# Patient Record
Sex: MALE | Race: WHITE | Employment: OTHER | RURAL
[De-identification: names, ages, dates, MRNs, and addresses within clinical notes are randomized per-mention and may not be internally consistent; named-entity substitution may affect disease eponyms.]

---

## 2017-06-21 ENCOUNTER — HOSPITAL ENCOUNTER (OUTPATIENT)
Dept: OTHER | Age: 67
Discharge: HOME | End: 2017-06-21
Attending: NURSE PRACTITIONER | Admitting: NURSE PRACTITIONER

## 2017-06-21 DIAGNOSIS — M25.531 ACUTE PAIN OF BOTH WRISTS: ICD-10-CM

## 2017-06-21 DIAGNOSIS — S05.11XA CONTUSION OF RIGHT EYE, INITIAL ENCOUNTER: ICD-10-CM

## 2017-06-21 DIAGNOSIS — M25.532 ACUTE PAIN OF BOTH WRISTS: ICD-10-CM

## 2017-06-21 DIAGNOSIS — R07.81 ANTERIOR PLEURITIC PAIN: ICD-10-CM

## 2018-07-11 ENCOUNTER — TELEPHONE (OUTPATIENT)
Dept: SURGERY | Age: 68
End: 2018-07-11

## 2018-07-11 NOTE — TELEPHONE ENCOUNTER
Received referral papers from [facility] for pt to see Dr. Blake Friends for a colon screening. I called pt to schedule the appointment, but received no answer. I left pt a voicemail and will try back at a later date.

## 2018-07-20 ENCOUNTER — OFFICE VISIT (OUTPATIENT)
Dept: SURGERY | Age: 68
End: 2018-07-20
Payer: MEDICARE

## 2018-07-20 ENCOUNTER — SURG/PROC ORDERS (OUTPATIENT)
Dept: SURGERY | Age: 68
End: 2018-07-20

## 2018-07-20 VITALS
HEIGHT: 75 IN | SYSTOLIC BLOOD PRESSURE: 127 MMHG | BODY MASS INDEX: 27.91 KG/M2 | HEART RATE: 87 BPM | RESPIRATION RATE: 18 BRPM | WEIGHT: 224.5 LBS | TEMPERATURE: 98.3 F | DIASTOLIC BLOOD PRESSURE: 92 MMHG

## 2018-07-20 DIAGNOSIS — R63.4 WEIGHT LOSS: ICD-10-CM

## 2018-07-20 DIAGNOSIS — Z12.11 COLON CANCER SCREENING: Primary | ICD-10-CM

## 2018-07-20 DIAGNOSIS — Z86.010 HISTORY OF ADENOMATOUS POLYP OF COLON: ICD-10-CM

## 2018-07-20 DIAGNOSIS — K92.1 HEMATOCHEZIA: ICD-10-CM

## 2018-07-20 PROCEDURE — G8427 DOCREV CUR MEDS BY ELIG CLIN: HCPCS | Performed by: SURGERY

## 2018-07-20 PROCEDURE — 3017F COLORECTAL CA SCREEN DOC REV: CPT | Performed by: SURGERY

## 2018-07-20 PROCEDURE — 1101F PT FALLS ASSESS-DOCD LE1/YR: CPT | Performed by: SURGERY

## 2018-07-20 PROCEDURE — 99204 OFFICE O/P NEW MOD 45 MIN: CPT | Performed by: SURGERY

## 2018-07-20 PROCEDURE — 99204 OFFICE O/P NEW MOD 45 MIN: CPT

## 2018-07-20 PROCEDURE — G8419 CALC BMI OUT NRM PARAM NOF/U: HCPCS | Performed by: SURGERY

## 2018-07-20 RX ORDER — CHOLECALCIFEROL (VITAMIN D3) 1250 MCG
1 CAPSULE ORAL WEEKLY
COMMUNITY

## 2018-07-20 RX ORDER — SODIUM CHLORIDE 0.9 % (FLUSH) 0.9 %
10 SYRINGE (ML) INJECTION PRN
Status: CANCELLED | OUTPATIENT
Start: 2018-07-20 | End: 2019-07-20

## 2018-07-20 RX ORDER — SODIUM CHLORIDE, SODIUM LACTATE, POTASSIUM CHLORIDE, CALCIUM CHLORIDE 600; 310; 30; 20 MG/100ML; MG/100ML; MG/100ML; MG/100ML
INJECTION, SOLUTION INTRAVENOUS CONTINUOUS
Status: CANCELLED | OUTPATIENT
Start: 2018-07-20 | End: 2019-07-20

## 2018-07-20 RX ORDER — SODIUM CHLORIDE 0.9 % (FLUSH) 0.9 %
10 SYRINGE (ML) INJECTION EVERY 12 HOURS SCHEDULED
Status: CANCELLED | OUTPATIENT
Start: 2018-07-20 | End: 2019-07-20

## 2018-07-20 RX ORDER — POTASSIUM CHLORIDE 1.5 G/1.77G
20 POWDER, FOR SOLUTION ORAL 2 TIMES DAILY
COMMUNITY

## 2018-07-20 RX ORDER — LORATADINE 10 MG/1
10 TABLET ORAL DAILY
COMMUNITY

## 2018-07-20 ASSESSMENT — ENCOUNTER SYMPTOMS
EYES NEGATIVE: 1
RESPIRATORY NEGATIVE: 1
GASTROINTESTINAL NEGATIVE: 1

## 2018-07-20 NOTE — PROGRESS NOTES
Consult Note    Patient:   Lydia Jamil   YOB: 1950   Facility:   [place] [address]   Referring/PCP: Kaden Florentino  Date:     7/20/2018  Consultant:   Maria Guadalupe Vidales MD  Reason for consult: colon screening    Subjective: This 79 y.o. male was seen  for colon screening. Patient has Last colonoscopy was 3 1/2 years ago and had benign adenomatous polyp. Information was obtained from interview of  the patient, examination of the patient, and review of records. I did  update the past medical, surgical, social and / or family history. Patient has had no symptoms. Has lost 20 pounds in last year due to dieting. bright red, visible only on toilet paper, occuring only during bowel movements, and occured a couple of months ago. Medications:     Prior to Admission medications    Medication Sig Start Date End Date Taking? Authorizing Provider   potassium chloride (KLOR-CON) 20 MEQ packet Take 20 mEq by mouth 2 times daily 1/2 tablet   Yes Historical Provider, MD   Cholecalciferol (VITAMIN D3) 17716 units CAPS Take 1 capsule by mouth once a week   Yes Historical Provider, MD   loratadine (CLARITIN) 10 MG tablet Take 10 mg by mouth daily   Yes Historical Provider, MD   levothyroxine (SYNTHROID) 175 MCG tablet Take 125 mcg by mouth Daily    Yes Historical Provider, MD   amLODIPine (NORVASC) 10 MG tablet Take 10 mg by mouth daily. Yes Historical Provider, MD   losartan (COZAAR) 100 MG tablet Take 50 mg by mouth 2 times daily (with meals). Yes Historical Provider, MD   albuterol (PROVENTIL HFA;VENTOLIN HFA) 108 (90 BASE) MCG/ACT inhaler Inhale 2 puffs into the lungs every 6 hours as needed for Wheezing. Yes Historical Provider, MD   gabapentin (NEURONTIN) 300 MG capsule Take 300 mg by mouth 2 times daily. Historical Provider, MD   cetirizine (ZYRTEC) 10 MG tablet Take 10 mg by mouth daily. Historical Provider, MD        Allergies:    Allergies   Allergen Cardiovascular: Normal rate, regular rhythm and normal heart sounds. Pulmonary/Chest: Breath sounds normal.   Abdominal: Soft. Bowel sounds are normal. He exhibits no distension and no mass. There is no tenderness. There is no guarding. Musculoskeletal: Normal range of motion. He exhibits no edema or tenderness. Neurological: He is alert and oriented to person, place, and time. Skin: Skin is warm and dry. Assessment: For elective colon screening  H/o colon polyps  Weight loss  Hematochezia      Plan:   1. Patient has reviewed colonoscopy pamphlet and we have discussed the possible risks and complications, including the remote chance of perforation, and the patient appears to understand and gives informed consent  2. We are scheduling a colonoscopy at [facility] on 07/25/18. 3. The patient will take the outpatient Miralax prep. Copy of this consult has been faxed to BHARATH Amezcua; thank you for the opportunity to participate in this patient's care.     Electronically signed by Jeremy Herrera MD on 7/20/2018 at 11:11 AM

## 2018-07-20 NOTE — LETTER
REQUEST AND INFORMED CONSENT FOR SURGICAL AND/OR DIAGNOSTIC PROCEDURE    Name: Lavelle Herrera   : 1950  Age: 79 y.o. Sex: male   MRN: [id number]  Guarantor Acct: [de-identified]   Clinic: [facility]  Physician: Dr. Cherre Sever, Lavelle Herrera ,hereby authorize Burke Bynum M.D., and his assistants that may be selected by him to administer such treatment necessary and to perform the following test/procedure/surgical procedure:    Colonoscopy with possible biopsies and/or polypectomy    The procedure necessary to treat my condition, as well as the alternate methods of treatment, and the attendant risks and consequences that are associated with the above have been explained to me by the physician. I have been informed there may be other risks from known and/or unknown causes that the practices of medicine and surgery is not an exact science, and I acknowledge that no guarantees have been made to me concerning the results of this procedure. The physician and/or their assistants have satisfactorily answered all questions, which I have asked about the procedure, in terms that I fully understand. I consent to the administration of anesthetic agents or medication deemed appropriate and desirable by the physician. I authorize and request the physician and his assistant(s) to perform any other tests/procedures in the event that unforeseen conditions are revealed as may be necessary and desirable in the exercise of his professional judgement. This authorization shall extend to treating all conditions that require treatment and are not known at the time this procedure commences.     By signing this form, I acknowledge that I have read it carefully or had it read or explained to me and that I understand this form and its content, and I hereby voluntarily consent to the above.      ________________________________  2018 10:33 AM   Patient or nearest relative      ________________________________ Relationship      ________________________________  Witness

## 2018-07-20 NOTE — LETTER
3:00 pm.   Mix one 238g bottle of MiraLAX with 64oz of Gatorade or Crystal Light in a pitcher. Drink a glass every 10  15 minutes until all 64 ounces is consumed. It is very important that you drink all of the MiraLAX/Gatorade or Crystal Light mixture. 5:00 p.m. Take 2 Dulcolax tablets with 8 to 12 ounces of clear liquids  5:00 p.m.  12:00 a.m. Continue with a clear liquid diet. You may not have anything to eat or drink after midnight. You may continue with your normal diet after the procedure is complete. Surgery Date: _______________  Instructions and Expectations One Day Prior to Procedure:  ? Someone from [facility] will be in contact with you to confirm your arrival time.  (if someone has not contacted you by 4:00 p.m. the day before your procedure, you will need to contact the office to confirm your arrival time)  ? NOTHING TO EAT OR DRINK AFTER MIDNIGHT (this also includes gum, mints, and tobacco products)    Special Instructions: Instructions for the Day of the Procedure:  ? Do not take any medications that morning, including blood pressure and diabetic medications. ? Bring all the medications with you to the hospital the morning of the procedure. ? Brush your teeth, rinse your mouth, but do not swallow the water. ? Leave all jewelry (including wedding rings) or anything valuable at home. ? All piercings should be removed as follows:  navel, tongue, ears, nipples, eyebrows, vagina, nose, or penis. ? The morning of surgery take a shower in plain soap and water. ? Do not wear any make-up, mascara, nail polish, or artificial nails. ? Hair should be clean and free of any sprays or gels. Items to Bring With You:  ? Insurance Cards  ? ID  ? Medication List   ? Medications  ? A responsible adult to drive you home upon discharge    Minors  (Anyone under 25years of age) must be accompanied by a parent or legal guardian at all times.

## 2018-07-25 ENCOUNTER — HOSPITAL ENCOUNTER (OUTPATIENT)
Facility: HOSPITAL | Age: 68
Setting detail: OUTPATIENT SURGERY
Discharge: HOME HEALTH | End: 2018-07-25
Attending: SURGERY | Admitting: SURGERY
Payer: MEDICARE

## 2018-07-25 ENCOUNTER — ANESTHESIA EVENT (OUTPATIENT)
Dept: ENDOSCOPY | Facility: HOSPITAL | Age: 68
End: 2018-07-25
Payer: MEDICARE

## 2018-07-25 ENCOUNTER — ANESTHESIA (OUTPATIENT)
Dept: ENDOSCOPY | Facility: HOSPITAL | Age: 68
End: 2018-07-25
Payer: MEDICARE

## 2018-07-25 VITALS
DIASTOLIC BLOOD PRESSURE: 103 MMHG | OXYGEN SATURATION: 97 % | TEMPERATURE: 97.2 F | RESPIRATION RATE: 16 BRPM | SYSTOLIC BLOOD PRESSURE: 144 MMHG | HEART RATE: 67 BPM

## 2018-07-25 PROCEDURE — 2500000003 HC RX 250 WO HCPCS: Performed by: NURSE ANESTHETIST, CERTIFIED REGISTERED

## 2018-07-25 PROCEDURE — 3700000000 HC ANESTHESIA ATTENDED CARE: Performed by: SURGERY

## 2018-07-25 PROCEDURE — 6360000002 HC RX W HCPCS: Performed by: NURSE ANESTHETIST, CERTIFIED REGISTERED

## 2018-07-25 PROCEDURE — 45378 DIAGNOSTIC COLONOSCOPY: CPT | Performed by: SURGERY

## 2018-07-25 PROCEDURE — 3609027000 HC COLONOSCOPY: Performed by: SURGERY

## 2018-07-25 PROCEDURE — 7100000010 HC PHASE II RECOVERY - FIRST 15 MIN: Performed by: SURGERY

## 2018-07-25 PROCEDURE — 3700000001 HC ADD 15 MINUTES (ANESTHESIA): Performed by: SURGERY

## 2018-07-25 PROCEDURE — 7100000011 HC PHASE II RECOVERY - ADDTL 15 MIN: Performed by: SURGERY

## 2018-07-25 PROCEDURE — 2580000003 HC RX 258: Performed by: SURGERY

## 2018-07-25 RX ORDER — SODIUM CHLORIDE, SODIUM LACTATE, POTASSIUM CHLORIDE, CALCIUM CHLORIDE 600; 310; 30; 20 MG/100ML; MG/100ML; MG/100ML; MG/100ML
INJECTION, SOLUTION INTRAVENOUS CONTINUOUS
Status: DISCONTINUED | OUTPATIENT
Start: 2018-07-25 | End: 2018-07-25 | Stop reason: HOSPADM

## 2018-07-25 RX ORDER — SODIUM CHLORIDE 0.9 % (FLUSH) 0.9 %
10 SYRINGE (ML) INJECTION EVERY 12 HOURS SCHEDULED
Status: DISCONTINUED | OUTPATIENT
Start: 2018-07-25 | End: 2018-07-25 | Stop reason: HOSPADM

## 2018-07-25 RX ORDER — LIDOCAINE HYDROCHLORIDE 10 MG/ML
INJECTION, SOLUTION EPIDURAL; INFILTRATION; INTRACAUDAL; PERINEURAL PRN
Status: DISCONTINUED | OUTPATIENT
Start: 2018-07-25 | End: 2018-07-25 | Stop reason: SDUPTHER

## 2018-07-25 RX ORDER — SODIUM CHLORIDE 0.9 % (FLUSH) 0.9 %
10 SYRINGE (ML) INJECTION PRN
Status: DISCONTINUED | OUTPATIENT
Start: 2018-07-25 | End: 2018-07-25 | Stop reason: HOSPADM

## 2018-07-25 RX ADMIN — PROPOFOL 20 MG: 10 INJECTION, EMULSION INTRAVENOUS at 08:59

## 2018-07-25 RX ADMIN — PROPOFOL 30 MG: 10 INJECTION, EMULSION INTRAVENOUS at 08:49

## 2018-07-25 RX ADMIN — PROPOFOL 20 MG: 10 INJECTION, EMULSION INTRAVENOUS at 09:02

## 2018-07-25 RX ADMIN — SODIUM CHLORIDE, POTASSIUM CHLORIDE, SODIUM LACTATE AND CALCIUM CHLORIDE: 600; 310; 30; 20 INJECTION, SOLUTION INTRAVENOUS at 08:00

## 2018-07-25 RX ADMIN — PROPOFOL 30 MG: 10 INJECTION, EMULSION INTRAVENOUS at 08:53

## 2018-07-25 RX ADMIN — LIDOCAINE HYDROCHLORIDE 20 MG: 10 INJECTION, SOLUTION EPIDURAL; INFILTRATION; INTRACAUDAL; PERINEURAL at 08:43

## 2018-07-25 RX ADMIN — PROPOFOL 50 MG: 10 INJECTION, EMULSION INTRAVENOUS at 08:43

## 2018-07-25 RX ADMIN — PROPOFOL 30 MG: 10 INJECTION, EMULSION INTRAVENOUS at 08:57

## 2018-07-25 ASSESSMENT — PAIN - FUNCTIONAL ASSESSMENT: PAIN_FUNCTIONAL_ASSESSMENT: 0-10

## 2018-07-25 ASSESSMENT — COPD QUESTIONNAIRES: CAT_SEVERITY: MILD

## 2018-07-25 NOTE — ANESTHESIA PRE PROCEDURE
Department of Anesthesiology  Preprocedure Note       Name:  Claudio Rojas   Age:  79 y.o.  :  1950                                          MRN:  [id number]         Date:  2018      Surgeon: Dilshad Smith):  Yoshi Llamas MD    Procedure: Procedure(s):  COLONOSCOPY    Medications prior to admission:   Prior to Admission medications    Medication Sig Start Date End Date Taking? Authorizing Provider   potassium chloride (KLOR-CON) 20 MEQ packet Take 20 mEq by mouth 2 times daily 1/2 tablet    Historical Provider, MD   Cholecalciferol (VITAMIN D3) 42247 units CAPS Take 1 capsule by mouth once a week    Historical Provider, MD   loratadine (CLARITIN) 10 MG tablet Take 10 mg by mouth daily    Historical Provider, MD   levothyroxine (SYNTHROID) 175 MCG tablet Take 125 mcg by mouth Daily     Historical Provider, MD   amLODIPine (NORVASC) 10 MG tablet Take 10 mg by mouth daily. Historical Provider, MD   gabapentin (NEURONTIN) 300 MG capsule Take 300 mg by mouth 2 times daily. Historical Provider, MD   cetirizine (ZYRTEC) 10 MG tablet Take 10 mg by mouth daily. Historical Provider, MD   losartan (COZAAR) 100 MG tablet Take 50 mg by mouth 2 times daily (with meals). Historical Provider, MD   albuterol (PROVENTIL HFA;VENTOLIN HFA) 108 (90 BASE) MCG/ACT inhaler Inhale 2 puffs into the lungs every 6 hours as needed for Wheezing. Historical Provider, MD       Current medications:    Current Facility-Administered Medications   Medication Dose Route Frequency Provider Last Rate Last Dose    lactated ringers infusion   Intravenous Continuous Yoshi Llamas MD   Stopped at 18 0946    sodium chloride flush 0.9 % injection 10 mL  10 mL Intravenous 2 times per day Yoshi Llamas MD        sodium chloride flush 0.9 % injection 10 mL  10 mL Intravenous PRN Yoshi Llamas MD           Allergies:     Allergies   Allergen Reactions    Neosporin [Neomycin-Polymyxin-Gramicidin] Other (See

## 2018-07-25 NOTE — OP NOTE
Colonoscopy Note    Patient:   Candise Ahumada    YOB: 1950    Facility:   [address] [place] [address]   Referring/PCP: BHARATH Whitman  Procedure:   Colonoscopy --screening  Date:     7/25/2018  Endoscopist:  Johnnie Ramirez MD    Preoperative Diagnosis:  previous adenomatous polyp  screening for colon cancer  hematochezia. Postoperative Diagnosis:  normal colonic mucosa throughout  hemorrhoids internal, Small in size    Anesthesia: Propofol 180 mg lidocaine 40mg    Estimated blood loss: < 5 ml    Complications:  None    Description of Procedure:  Informed consent was obtained from the patient after explanation of the procedure including indications, description of the procedure,  benefits and possible risks and complications of the procedure, and alternatives. Questions were answered. The patient's history was reviewed and a directed physical examination was performed prior to the procedure. Patient was monitored throughout the procedure with pulse oximetry and periodic assessment of vital signs. Patient was sedated as noted above. With the patient initially in the left lateral decubitus position, a digital rectal examination was performed and revealed negative without mass, lesions or tenderness. The Olympus video colonoscope was placed in the patient's rectum and advanced without difficulty  to the cecum, which was identified by the ileocecal valve and appendiceal orifice. Photographs were taken. The prep was good. Examination of the mucosa was performed during both introduction and withdrawal of the colonoscope. Retroflexed view of the rectum was performed. Withdrawal time was 7 minutes. Findings:     1. normal colonic mucosa throughout  2. hemorrhoids internal, Small in size     Recommendations:   For colon cancer screening in this average-risk patient, colonoscopy may be repeated in 5 years      Electronically signed by Johnnie Ramirez MD on 7/25/2018 at 8:57 AM

## 2018-07-25 NOTE — ANESTHESIA POSTPROCEDURE EVALUATION
Department of Anesthesiology  Postprocedure Note    Patient: Pily Iqbal  MRN: [id number]  YOB: 1950  Date of evaluation: 7/25/2018  Time:  11:52 AM     Procedure Summary     Date:  07/25/18 Room / Location:  [place] [address] / [facility]  ENDOSCOPY    [facility] Stop[facility]  2178    Procedure:  COLONOSCOPY (N/A Anus) Diagnosis:       (Z12.11)      (Z86.010)      (R63.4)      (R92.1)    Surgeon:  Kavitha Rivera MD Responsible Provider:      Anesthesia Type:  MAC ASA Status:  3          Anesthesia Type: MAC    Yokasta Phase I: Yokasta Score: 10    Yokasta Phase II: Yokasta Score: 10    Last vitals: Reviewed and per EMR flowsheets.        Anesthesia Post Evaluation    Patient location during evaluation: bedside  Patient participation: complete - patient participated  Level of consciousness: awake and alert  Airway patency: patent  Nausea & Vomiting: no nausea and no vomiting  Complications: no  Cardiovascular status: blood pressure returned to baseline  Respiratory status: acceptable  Hydration status: stable

## 2018-07-26 RX ORDER — PROPOFOL 10 MG/ML
INJECTION, EMULSION INTRAVENOUS PRN
Status: DISCONTINUED | OUTPATIENT
Start: 2018-07-25 | End: 2018-07-26 | Stop reason: SDUPTHER

## 2025-01-01 ENCOUNTER — HOSPITAL ENCOUNTER (EMERGENCY)
Facility: HOSPITAL | Age: 75
End: 2025-04-29
Payer: MEDICARE

## 2025-01-01 VITALS — BODY MASS INDEX: 27.35 KG/M2 | WEIGHT: 220 LBS | HEIGHT: 75 IN

## 2025-01-01 DIAGNOSIS — I31.39 PERICARDIAL EFFUSION: ICD-10-CM

## 2025-01-01 DIAGNOSIS — I46.9 CARDIAC ARREST (HCC): Primary | ICD-10-CM

## 2025-01-01 PROCEDURE — 92950 HEART/LUNG RESUSCITATION CPR: CPT

## 2025-01-01 PROCEDURE — 36680 INSERT NEEDLE BONE CAVITY: CPT

## 2025-01-01 PROCEDURE — 6360000002 HC RX W HCPCS: Performed by: STUDENT IN AN ORGANIZED HEALTH CARE EDUCATION/TRAINING PROGRAM

## 2025-01-01 PROCEDURE — 99285 EMERGENCY DEPT VISIT HI MDM: CPT

## 2025-01-01 PROCEDURE — 31500 INSERT EMERGENCY AIRWAY: CPT

## 2025-01-01 RX ORDER — EPINEPHRINE IN SOD CHLOR,ISO 1 MG/10 ML
SYRINGE (ML) INTRAVENOUS DAILY PRN
Status: COMPLETED | OUTPATIENT
Start: 2025-01-01 | End: 2025-01-01

## 2025-01-01 RX ADMIN — EPINEPHRINE 1 MG: 0.1 INJECTION, SOLUTION ENDOTRACHEAL; INTRACARDIAC; INTRAVENOUS at 14:24

## 2025-01-01 RX ADMIN — EPINEPHRINE 1 MG: 0.1 INJECTION, SOLUTION ENDOTRACHEAL; INTRACARDIAC; INTRAVENOUS at 14:27

## 2025-04-29 NOTE — ED TRIAGE NOTES
Pt arrived via [facility], CPR in progress, Igel in place.   Per paramedic, dispatch received a call from pt that he was having difficulty breathing. EMS reports they arrived approx 10 minutes later and found pt slumped over in recliner. Reports witnessed 2 agonal respirations, was transferred onto floor, unresponsive, absent pulse, PEA on monitor. CPR started, one intubation attempt unsuccessful, IGEL placed. Pt received total of 3 epi's via R IO. Reports pt remained in PEA throughout pulse checks. EMS reports no witnesses on scene.     Dispatch received the call at 1341, EMS arrived at the residence @ 1345, ER arrival to room 1421.

## 2025-04-29 NOTE — ED PROVIDER NOTES
[facility]  EMERGENCY DEPARTMENT ENCOUNTER        Pt Name: Jeyson Wade  MRN: [id number]  Birthdate 1950  Date of evaluation: 4/29/2025  Provider: Iggy Eduardo DO  PCP: Kim Cabrales APRN - NP  Note Started: 2:32 PM EDT 4/29/25    CHIEF COMPLAINT       Chief Complaint   Patient presents with    Code       HISTORY OF PRESENT ILLNESS: 1 or more Elements     History from : Patient and EMS    Limitations to history : Cardiac arrest    Jeyson Wade is a 75 y.o. male who presents presenting cardiac arrest.  25-minute downtime prior to arrival here.  Recently called for chest pain shortness of breath.  Per EMS had taken 2 agonal respirations.  On arrival here had an LMA in place.  Received 2 rounds of epinephrine.  Remained in PEA    Nursing Notes were all reviewed and agreed with or any disagreements were addressed in the HPI.    REVIEW OF SYSTEMS :      Review of Systems    Review of systems reviewed and negative except as in HPI/MDM    PAST MEDICAL HISTORY     Past Medical History:   Diagnosis Date    Arthritis     Asthma     COPD (chronic obstructive pulmonary disease) (HCC)     Diabetes mellitus (HCC)     Hep B w/o coma 2002    Hep C w/ coma, chronic (HCC) 1996    Hypertension     Thyroid disease        SURGICAL HISTORY     Past Surgical History:   Procedure Laterality Date    COLONOSCOPY  2016    FRACTURE SURGERY  2005    right ankle    IA COLONOSCOPY FLX DX W/COLLJ SPEC WHEN PFRMD N/A 7/25/2018    COLONOSCOPY performed by Nathaniel Rico MD at [facility] ENDOSCOPY    TOE SURGERY Bilateral 1982    2nd -5th toes bilaterally       CURRENTMEDICATIONS       Discharge Medication List as of 4/29/2025  4:46 PM        CONTINUE these medications which have NOT CHANGED    Details   potassium chloride (KLOR-CON) 20 MEQ packet Take 20 mEq by mouth 2 times daily 1/2 tabletHistorical Med      Cholecalciferol (VITAMIN D3) 15456 units CAPS Take 1 capsule by mouth once a

## 2025-04-29 NOTE — ED NOTES
Another attempted was made to contact Daughter,  Contacted [facility] to attempt to find further information on [place], Address listed on [facility] record was another City and no phone contact.    advised that he is going to continue to attempt contacted, will be contacted Police for that area to attempt to make contact at that address. Advised body will be taken to [facility] by .

## 2025-04-29 NOTE — CODE DOCUMENTATION
has sent CO to daughters address on file, address was no longer valid, unable to locate at this time.